# Patient Record
Sex: MALE | Race: WHITE | NOT HISPANIC OR LATINO | Employment: FULL TIME | URBAN - METROPOLITAN AREA
[De-identification: names, ages, dates, MRNs, and addresses within clinical notes are randomized per-mention and may not be internally consistent; named-entity substitution may affect disease eponyms.]

---

## 2022-09-01 ENCOUNTER — HOSPITAL ENCOUNTER (EMERGENCY)
Facility: HOSPITAL | Age: 62
Discharge: HOME | End: 2022-09-01
Attending: EMERGENCY MEDICINE
Payer: COMMERCIAL

## 2022-09-01 VITALS
OXYGEN SATURATION: 99 % | HEART RATE: 85 BPM | SYSTOLIC BLOOD PRESSURE: 153 MMHG | DIASTOLIC BLOOD PRESSURE: 82 MMHG | RESPIRATION RATE: 20 BRPM | TEMPERATURE: 98 F

## 2022-09-01 DIAGNOSIS — M54.6 ACUTE BILATERAL THORACIC BACK PAIN: ICD-10-CM
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
## 2022-09-01 PROCEDURE — 99284 EMERGENCY DEPT VISIT MOD MDM: CPT | Mod: 25,ER

## 2022-09-01 RX ORDER — NAPROXEN 375 MG/1
375 TABLET ORAL 2 TIMES DAILY WITH MEALS
Qty: 30 TABLET | Refills: 0 | Status: SHIPPED | OUTPATIENT
Start: 2022-09-01

## 2022-09-01 RX ORDER — CYCLOBENZAPRINE HCL 10 MG
10 TABLET ORAL 3 TIMES DAILY PRN
Qty: 15 TABLET | Refills: 0 | Status: SHIPPED | OUTPATIENT
Start: 2022-09-01 | End: 2022-09-06
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
## 2022-12-20 ENCOUNTER — HOSPITAL ENCOUNTER (EMERGENCY)
Facility: HOSPITAL | Age: 62
Discharge: HOME | End: 2022-12-20
Attending: EMERGENCY MEDICINE
Payer: COMMERCIAL

## 2022-12-20 VITALS
OXYGEN SATURATION: 97 % | TEMPERATURE: 98 F | RESPIRATION RATE: 20 BRPM | DIASTOLIC BLOOD PRESSURE: 86 MMHG | WEIGHT: 281.88 LBS | SYSTOLIC BLOOD PRESSURE: 170 MMHG | HEART RATE: 87 BPM

## 2022-12-20 DIAGNOSIS — S22.32XA CLOSED FRACTURE OF ONE RIB OF LEFT SIDE, INITIAL ENCOUNTER: ICD-10-CM

## 2022-12-20 DIAGNOSIS — R07.81 RIB PAIN: ICD-10-CM

## 2022-12-20 DIAGNOSIS — R07.81 RIB PAIN ON LEFT SIDE: Primary | ICD-10-CM

## 2022-12-20 PROCEDURE — 99283 EMERGENCY DEPT VISIT LOW MDM: CPT | Mod: ER

## 2022-12-20 RX ORDER — HYDROCODONE BITARTRATE AND ACETAMINOPHEN 5; 325 MG/1; MG/1
1 TABLET ORAL EVERY 4 HOURS PRN
Qty: 11 TABLET | Refills: 0 | Status: SHIPPED | OUTPATIENT
Start: 2022-12-20 | End: 2022-12-25

## 2022-12-20 NOTE — ED PROVIDER NOTES
Encounter Date: 12/20/2022       History     Chief Complaint   Patient presents with    Rib Injury     States fell this morning while walking and hit left side ribs and hit head on ground.      Patient slipped and fell, and sustained an injury to his left ribs.      The history is provided by the patient.   Chest Pain  The current episode started today. Chest pain occurs constantly. The chest pain is unchanged. The quality of the pain is described as aching. Pertinent negatives for primary symptoms include no fever, no shortness of breath and no nausea.   Pertinent negatives for associated symptoms include no weakness.   Review of patient's allergies indicates:  No Known Allergies  History reviewed. No pertinent past medical history.  History reviewed. No pertinent surgical history.  History reviewed. No pertinent family history.  Social History     Tobacco Use    Smoking status: Former     Types: Cigarettes    Smokeless tobacco: Never   Substance Use Topics    Alcohol use: Yes     Review of Systems   Constitutional:  Negative for fever.   HENT:  Negative for sore throat.    Respiratory:  Negative for shortness of breath.    Cardiovascular:  Positive for chest pain.   Gastrointestinal:  Negative for nausea.   Genitourinary:  Negative for dysuria.   Musculoskeletal:  Negative for back pain.   Skin:  Negative for rash.   Neurological:  Negative for weakness.   Hematological:  Does not bruise/bleed easily.     Physical Exam     Initial Vitals [12/20/22 1015]   BP Pulse Resp Temp SpO2   (!) 170/86 87 20 97.5 °F (36.4 °C) 97 %      MAP       --         Physical Exam    Nursing note and vitals reviewed.  Constitutional: He appears well-developed and well-nourished. No distress.   HENT:   Head: Normocephalic and atraumatic.   Mouth/Throat: Oropharynx is clear and moist.   Eyes: Conjunctivae and EOM are normal. Pupils are equal, round, and reactive to light.   Neck: Neck supple.   Normal range of motion.  Cardiovascular:   Normal rate, regular rhythm and normal heart sounds.     Exam reveals no gallop and no friction rub.       No murmur heard.  Pulmonary/Chest: Breath sounds normal. No respiratory distress. He has no wheezes. He has no rhonchi. He has no rales.     Abdominal: Abdomen is soft. Bowel sounds are normal. He exhibits no distension and no mass. There is no abdominal tenderness. There is no rebound and no guarding.   Musculoskeletal:         General: No edema. Normal range of motion.      Cervical back: Normal range of motion and neck supple.     Neurological: He is alert and oriented to person, place, and time. He has normal strength.   Skin: Skin is warm and dry. No rash noted.   Psychiatric: He has a normal mood and affect. Thought content normal.       ED Course   Procedures  Labs Reviewed - No data to display       Imaging Results              X-Ray Ribs 2 View Left (Final result)  Result time 12/20/22 10:50:06      Final result by Partha Chavis MD (12/20/22 10:50:06)                   Impression:      1.  Negative for acute process involving the chest.  Specifically, negative for acute rib fracture or sequela thereof.    2.  Stable and incidental findings as noted above.      Electronically signed by: Partha Chavis MD  Date:    12/20/2022  Time:    10:50               Narrative:    EXAMINATION:  XR RIBS 2 VIEW LEFT    CLINICAL HISTORY:  Pleurodynia    COMPARISON:  Chest x-ray from February 19, 2009    FINDINGS:  Eight images were provided for review.  The right lateral lung is excluded.  The visualized portions of the lungs are clear.  The cardiac silhouette size is enlarged.  The trachea is midline and the mediastinal width is normal. Negative for focal infiltrate, effusion or pneumothorax. Pulmonary vasculature is normal. Negative for osseous abnormalities. Tortuous aorta with aortic arch calcifications.  Degenerative changes of the spine.  There are changes of an old fracture involving the anterior left 6th rib.   Negative for evidence for an acute rib fracture.                                     Reviewed by me, I see a subtle nondisplace rib fracture.  Will treat as such.    Medications - No data to display                           Clinical Impression:   Final diagnoses:  [R07.81] Rib pain  [R07.81] Rib pain on left side (Primary)  [S22.32XA] Closed fracture of one rib of left side, initial encounter        ED Disposition Condition    Discharge Stable          ED Prescriptions       Medication Sig Dispense Start Date End Date Auth. Provider    HYDROcodone-acetaminophen (NORCO) 5-325 mg per tablet Take 1 tablet by mouth every 4 (four) hours as needed. 11 tablet 12/20/2022 12/25/2022 Morgan Whitman MD          Follow-up Information       Follow up With Specialties Details Why Contact Info    Liam Joe MD Family Medicine   [facility] [facility] [address] [phone]               Morgan Whitman MD  12/20/22 2889